# Patient Record
Sex: MALE | Race: WHITE | NOT HISPANIC OR LATINO | Employment: OTHER | URBAN - NONMETROPOLITAN AREA
[De-identification: names, ages, dates, MRNs, and addresses within clinical notes are randomized per-mention and may not be internally consistent; named-entity substitution may affect disease eponyms.]

---

## 2023-09-12 ENCOUNTER — APPOINTMENT (OUTPATIENT)
Dept: GENERAL RADIOLOGY | Facility: OTHER | Age: 68
End: 2023-09-12
Attending: FAMILY MEDICINE
Payer: COMMERCIAL

## 2023-09-12 ENCOUNTER — HOSPITAL ENCOUNTER (EMERGENCY)
Facility: OTHER | Age: 68
Discharge: HOME | End: 2023-09-12
Attending: FAMILY MEDICINE | Admitting: FAMILY MEDICINE
Payer: COMMERCIAL

## 2023-09-12 VITALS
SYSTOLIC BLOOD PRESSURE: 158 MMHG | WEIGHT: 285 LBS | HEART RATE: 58 BPM | OXYGEN SATURATION: 98 % | DIASTOLIC BLOOD PRESSURE: 98 MMHG | TEMPERATURE: 98.1 F | BODY MASS INDEX: 43.19 KG/M2 | RESPIRATION RATE: 24 BRPM | HEIGHT: 68 IN

## 2023-09-12 DIAGNOSIS — I48.0 INTERMITTENT ATRIAL FIBRILLATION (H): ICD-10-CM

## 2023-09-12 LAB
ANION GAP SERPL CALCULATED.3IONS-SCNC: 11 MMOL/L (ref 7–15)
BASOPHILS # BLD AUTO: 0 10E3/UL (ref 0–0.2)
BASOPHILS NFR BLD AUTO: 1 %
BUN SERPL-MCNC: 22.3 MG/DL (ref 8–23)
CALCIUM SERPL-MCNC: 10.1 MG/DL (ref 8.8–10.2)
CHLORIDE SERPL-SCNC: 100 MMOL/L (ref 98–107)
CREAT SERPL-MCNC: 0.95 MG/DL (ref 0.67–1.17)
DEPRECATED HCO3 PLAS-SCNC: 29 MMOL/L (ref 22–29)
EGFRCR SERPLBLD CKD-EPI 2021: 88 ML/MIN/1.73M2
EOSINOPHIL # BLD AUTO: 0.1 10E3/UL (ref 0–0.7)
EOSINOPHIL NFR BLD AUTO: 1 %
ERYTHROCYTE [DISTWIDTH] IN BLOOD BY AUTOMATED COUNT: 12.4 % (ref 10–15)
GLUCOSE SERPL-MCNC: 124 MG/DL (ref 70–99)
HCT VFR BLD AUTO: 50.5 % (ref 40–53)
HGB BLD-MCNC: 17.4 G/DL (ref 13.3–17.7)
HOLD SPECIMEN: NORMAL
IMM GRANULOCYTES # BLD: 0 10E3/UL
IMM GRANULOCYTES NFR BLD: 0 %
LYMPHOCYTES # BLD AUTO: 2.1 10E3/UL (ref 0.8–5.3)
LYMPHOCYTES NFR BLD AUTO: 26 %
MCH RBC QN AUTO: 30.7 PG (ref 26.5–33)
MCHC RBC AUTO-ENTMCNC: 34.5 G/DL (ref 31.5–36.5)
MCV RBC AUTO: 89 FL (ref 78–100)
MONOCYTES # BLD AUTO: 0.7 10E3/UL (ref 0–1.3)
MONOCYTES NFR BLD AUTO: 8 %
NEUTROPHILS # BLD AUTO: 5.3 10E3/UL (ref 1.6–8.3)
NEUTROPHILS NFR BLD AUTO: 64 %
NRBC # BLD AUTO: 0 10E3/UL
NRBC BLD AUTO-RTO: 0 /100
PLATELET # BLD AUTO: 181 10E3/UL (ref 150–450)
POTASSIUM SERPL-SCNC: 4 MMOL/L (ref 3.4–5.3)
RBC # BLD AUTO: 5.67 10E6/UL (ref 4.4–5.9)
SODIUM SERPL-SCNC: 140 MMOL/L (ref 136–145)
TROPONIN T SERPL HS-MCNC: 32 NG/L
TROPONIN T SERPL HS-MCNC: 35 NG/L
WBC # BLD AUTO: 8.2 10E3/UL (ref 4–11)

## 2023-09-12 PROCEDURE — 93010 ELECTROCARDIOGRAM REPORT: CPT | Performed by: STUDENT IN AN ORGANIZED HEALTH CARE EDUCATION/TRAINING PROGRAM

## 2023-09-12 PROCEDURE — 85025 COMPLETE CBC W/AUTO DIFF WBC: CPT | Performed by: FAMILY MEDICINE

## 2023-09-12 PROCEDURE — 99285 EMERGENCY DEPT VISIT HI MDM: CPT | Mod: 25 | Performed by: FAMILY MEDICINE

## 2023-09-12 PROCEDURE — 93005 ELECTROCARDIOGRAM TRACING: CPT | Performed by: FAMILY MEDICINE

## 2023-09-12 PROCEDURE — 71045 X-RAY EXAM CHEST 1 VIEW: CPT

## 2023-09-12 PROCEDURE — 99284 EMERGENCY DEPT VISIT MOD MDM: CPT | Performed by: FAMILY MEDICINE

## 2023-09-12 PROCEDURE — 84484 ASSAY OF TROPONIN QUANT: CPT | Performed by: FAMILY MEDICINE

## 2023-09-12 PROCEDURE — 80048 BASIC METABOLIC PNL TOTAL CA: CPT | Performed by: FAMILY MEDICINE

## 2023-09-12 PROCEDURE — 36415 COLL VENOUS BLD VENIPUNCTURE: CPT | Performed by: FAMILY MEDICINE

## 2023-09-12 ASSESSMENT — ACTIVITIES OF DAILY LIVING (ADL): ADLS_ACUITY_SCORE: 35

## 2023-09-12 ASSESSMENT — ENCOUNTER SYMPTOMS: PALPITATIONS: 1

## 2023-09-12 NOTE — ED TRIAGE NOTES
"Patient presents with chest pain that started this morning and again this afternoon.  Patient denies SOB and currently states he has no chest pain.  Patient with a history of a fib and had an ablation this year for this.  Patient reports his heart rate was 154 bpm this afternoon as well.  Patient arrives in no acute distress, skin pink, warm and dry.BP (!) 136/104   Pulse 85   Temp 98.1  F (36.7  C) (Tympanic)   Resp 14   Ht 1.727 m (5' 8\")   Wt 129.3 kg (285 lb)   SpO2 96%   BMI 43.33 kg/m         Triage Assessment       Row Name 09/12/23 [id number]       Triage Assessment (Adult)    Airway WDL WDL       Respiratory WDL    Respiratory WDL WDL       Skin Circulation/Temperature WDL    Skin Circulation/Temperature WDL WDL       Cardiac WDL    Cardiac WDL X;chest pain       Peripheral/Neurovascular WDL    Peripheral Neurovascular WDL WDL       Cognitive/Neuro/Behavioral WDL    Cognitive/Neuro/Behavioral WDL WDL                    "

## 2023-09-12 NOTE — ED PROVIDER NOTES
History     Chief Complaint   Patient presents with    Chest Pain     The history is provided by the patient, medical records and the spouse.     Justino Mcclain is a 68 year old male who had rapid heart rate and chest pain at about 3:25 PM today. He has a history of atrial fib and was cardioverted about two years ago. He went back into atrial fib and abruptly into CHF. He as stated on Amiodarone. He was on that for a few years before he had an ablation done in [place], [place] in Feb 2023. He was off the Amiodarone by June, three months ago. He had SVT with a rate of 180 about two weeks ago and got a Cardio Mobile. He has been monitoring his rate and rhythm and on Sunday, two days ago, had NSR with normal rate. Last night he had atrial fib with rate 140 and this morning he had NSR with rate 98.  The Cardio Mobile showed atrial fib this afternoon again and he took a second metoprolol tartrate 25 mg per his cardiologist back home. At 3:25 the monitor showed atrial fib with rate 154, and he had chest pain, so they came to the ED.     He has a history of atrial fib (on Eliquis), dCHF, HTN (on Lasix, metoprolol), pHTN, obesity, DM (on metformin).     His last ECHO from Feb 2023 was stable compared to January 2022: LVF normal. Ejection Fraction = 60-65%. The left ventricle is mildly dilated. Overall diastolic indices are consistent with moderate diastolic dysfunction.  Moderate left atrial enlargement. Moderate valvular aortic stenosis. Mean gradient across aortic valve is 27mmHg. The ascending aorta is dilated with a maximal diameter of 4.2cm. There is no pericardial effusion. No change since January 2022.    Allergies:  No Known Allergies    Problem List:    There are no problems to display for this patient.       Past Medical History:    No past medical history on file.    Past Surgical History:    No past surgical history on file.    Family History:    No family history on file.    Social History:  Marital Status:   " [2]        Medications:    No current outpatient medications on file.        Review of Systems   Cardiovascular:  Positive for chest pain and palpitations.   All other systems reviewed and are negative.      Physical Exam   BP: (!) 136/104  Pulse: 85  Temp: 98.1  F (36.7  C)  Resp: 14  Height: 172.7 cm (5' 8\")  Weight: 129.3 kg (285 lb)  SpO2: 96 %      Physical Exam  Vitals and nursing note reviewed.   Constitutional:       General: He is not in acute distress.     Appearance: He is well-developed. He is obese. He is not ill-appearing, toxic-appearing or diaphoretic.   Cardiovascular:      Rate and Rhythm: Normal rate and regular rhythm.      Pulses:           Radial pulses are 2+ on the right side and 2+ on the left side.        Posterior tibial pulses are 2+ on the right side and 2+ on the left side.      Heart sounds: Normal heart sounds.   Pulmonary:      Effort: Pulmonary effort is normal. No respiratory distress.      Breath sounds: Normal breath sounds.   Chest:      Chest wall: No mass, tenderness or crepitus.   Abdominal:      General: Bowel sounds are normal.      Palpations: Abdomen is soft.      Tenderness: There is no abdominal tenderness.   Musculoskeletal:      Right lower leg: No tenderness. Edema present.      Left lower leg: No tenderness. Edema present.      Comments: Minimal edema bilaterally   Skin:     General: Skin is warm and dry.   Neurological:      General: No focal deficit present.      Mental Status: He is alert and oriented to person, place, and time.   Psychiatric:         Mood and Affect: Mood normal.         Behavior: Behavior normal.       EKG: NSR, rate 84, normal axis.    Results for orders placed or performed during the hospital encounter of 09/12/23 (from the past 24 hour(s))   Extra Tube ([facility] Draw)    Narrative    The following orders were created for panel order Extra Tube ([facility] Draw).  Procedure                               Abnormality         Status          "            ---------                               -----------         ------                     Extra Blue Top Tube[[id number]]                              Final result               Extra Red Top Tube[[id number]]                               Final result               Extra Green Top (Lithium...[[id number]]                      Final result               Extra Purple Top Tube[[id number]]                            Final result               Extra Green Top (Lithium...[[id number]]                      Final result                 Please view results for these tests on the individual orders.   Extra Blue Top Tube   Result Value Ref Range    Hold Specimen JIC    Extra Red Top Tube   Result Value Ref Range    Hold Specimen JIC    Extra Green Top (Lithium Heparin) Tube   Result Value Ref Range    Hold Specimen JIC    Extra Purple Top Tube   Result Value Ref Range    Hold Specimen JIC    Extra Green Top (Lithium Heparin) ON ICE   Result Value Ref Range    Hold Specimen JIC    CBC with platelets differential    Narrative    The following orders were created for panel order CBC with platelets differential.  Procedure                               Abnormality         Status                     ---------                               -----------         ------                     CBC with platelets and d...[[id number]]                      Final result                 Please view results for these tests on the individual orders.   Basic metabolic panel   Result Value Ref Range    Sodium 140 136 - 145 mmol/L    Potassium 4.0 3.4 - 5.3 mmol/L    Chloride 100 98 - 107 mmol/L    Carbon Dioxide (CO2) 29 22 - 29 mmol/L    Anion Gap 11 7 - 15 mmol/L    Urea Nitrogen 22.3 8.0 - 23.0 mg/dL    Creatinine 0.95 0.67 - 1.17 mg/dL    Calcium 10.1 8.8 - 10.2 mg/dL    Glucose 124 (H) 70 - 99 mg/dL    GFR Estimate 88 >60 mL/min/1.73m2   Troponin T, High Sensitivity   Result Value Ref Range    Troponin T, High Sensitivity 35 (H) <=22 ng/L   CBC  with platelets and differential   Result Value Ref Range    WBC Count 8.2 4.0 - 11.0 10e3/uL    RBC Count 5.67 4.40 - 5.90 10e6/uL    Hemoglobin 17.4 13.3 - 17.7 g/dL    Hematocrit 50.5 40.0 - 53.0 %    MCV 89 78 - 100 fL    MCH 30.7 26.5 - 33.0 pg    MCHC 34.5 31.5 - 36.5 g/dL    RDW 12.4 10.0 - 15.0 %    Platelet Count 181 150 - 450 10e3/uL    % Neutrophils 64 %    % Lymphocytes 26 %    % Monocytes 8 %    % Eosinophils 1 %    % Basophils 1 %    % Immature Granulocytes 0 %    NRBCs per 100 WBC 0 <1 /100    Absolute Neutrophils 5.3 1.6 - 8.3 10e3/uL    Absolute Lymphocytes 2.1 0.8 - 5.3 10e3/uL    Absolute Monocytes 0.7 0.0 - 1.3 10e3/uL    Absolute Eosinophils 0.1 0.0 - 0.7 10e3/uL    Absolute Basophils 0.0 0.0 - 0.2 10e3/uL    Absolute Immature Granulocytes 0.0 <=0.4 10e3/uL    Absolute NRBCs 0.0 10e3/uL   XR Chest Port 1 View    Narrative    PROCEDURE:  XR CHEST PORT 1 VIEW    HISTORY: irregular heart rhythm. .    COMPARISON:  None.    FINDINGS:    The cardiomediastinal contours are magnified by portable technique.  Trace pleural fluid is questioned. The right posterior lung base is  not well assessed. No pneumothorax. The right hemidiaphragm is  elevated      Impression    IMPRESSION:  Probable cardiomegaly and trace pleural fluid.    BRADLY MENDOZA MD         SYSTEM ID:  [id number]   Troponin T, High Sensitivity   Result Value Ref Range    Troponin T, High Sensitivity 32 (H) <=22 ng/L         Assessments & Plan (with Medical Decision Making)  Justino Mcclain is a 68 year old male who had rapid heart rate and chest pain at about 3:25 PM today. He has a history of atrial fib and was cardioverted about two years ago. He went back into atrial fib and abruptly into CHF. He as stated on Amiodarone. He was on that for a few years before he had an ablation done in [place], [place] in Feb 2023. He was off the Amiodarone by June, three months ago. He had SVT with a rate of 180 about two weeks ago and got a Cardio  "Mobile. He has been monitoring his rate and rhythm and on Sunday, two days ago, had NSR with normal rate. Last night he had atrial fib with rate 140 and this morning he had NSR with rate 98.  The Cardio Mobile showed atrial fib this afternoon again and he took a second metoprolol tartrate 25 mg per his cardiologist back home. At 3:25 the monitor showed atrial fib with rate 154, and he had chest pain, so they came to the ED.  He has a history of atrial fib (on Eliquis), dCHF, HTN (on Lasix, metoprolol), pHTN, obesity, DM (on metformin).  His last ECHO from Feb 2023 was stable compared to January 2022: LVF normal. Ejection Fraction = 60-65%. The left ventricle is mildly dilated. Overall diastolic indices are consistent with moderate diastolic dysfunction.  Moderate left atrial enlargement. Moderate valvular aortic stenosis. Mean gradient across aortic valve is 27mmHg. The ascending aorta is dilated with a maximal diameter of 4.2cm. There is no pericardial effusion. No change since January 2022.  VS in the ED BP (!) 151/93   Pulse 60   Temp 98.1  F (36.7  C) (Tympanic)   Resp 23   Ht 1.727 m (5' 8\")   Wt 129.3 kg (285 lb)   SpO2 96%   BMI 43.33 kg/m    Exam shows minimal bilateral LE edema. Remainder of exam normal.  EKG normal.  Labs show CBC normal, BMP normal, troponin 35.  Chest xray shows probable cardiomegaly and trace pleural fluid.  8:26 PM   Repeat troponin 32     I have reviewed the nursing notes.    I have reviewed the findings, diagnosis, plan and need for follow up with the patient.     Medical Decision Making  The patient's presentation was of moderate complexity (a chronic illness mild to moderate exacerbation, progression, or side effect of treatment).    The patient's evaluation involved:  an assessment requiring an independent historian (see separate area of note for details)  ordering and/or review of 3+ test(s) in this encounter (see separate area of note for details)    The patient's " management necessitated only low risk treatment.    Final diagnoses:   Intermittent atrial fibrillation (H)       9/12/2023   [facility] MD  09/12/23 2026

## 2023-09-13 LAB
ATRIAL RATE - MUSE: 84 BPM
DIASTOLIC BLOOD PRESSURE - MUSE: NORMAL MMHG
INTERPRETATION ECG - MUSE: NORMAL
P AXIS - MUSE: 58 DEGREES
PR INTERVAL - MUSE: 162 MS
QRS DURATION - MUSE: 82 MS
QT - MUSE: 374 MS
QTC - MUSE: 441 MS
R AXIS - MUSE: 16 DEGREES
SYSTOLIC BLOOD PRESSURE - MUSE: NORMAL MMHG
T AXIS - MUSE: 58 DEGREES
VENTRICULAR RATE- MUSE: 84 BPM

## 2023-09-13 NOTE — DISCHARGE INSTRUCTIONS
Justino    I recommend that you take an extra metoprolol tartrate if this happens again.     Thank you for choosing our Emergency Department for your care.     You may receive a phone call or letter for a survey about your care in our ED.  Please complete this as this is how we improve care for our patients.     If you have any questions after leaving the ED you can call or text me on my cell phone at [phone] and I will get back to you at some point. This does not mean that I am on call and if you are not doing well please return to the ED.     Sincerely,    Dr Luis Enrique Madera M.D.